# Patient Record
Sex: FEMALE | Race: WHITE | Employment: FULL TIME | URBAN - METROPOLITAN AREA
[De-identification: names, ages, dates, MRNs, and addresses within clinical notes are randomized per-mention and may not be internally consistent; named-entity substitution may affect disease eponyms.]

---

## 2021-04-15 PROBLEM — N39.41 URGE INCONTINENCE: Status: ACTIVE | Noted: 2017-08-08

## 2021-04-15 PROBLEM — Z86.008 HISTORY OF CARCINOMA IN SITU OF BLADDER: Status: ACTIVE | Noted: 2021-03-10

## 2021-11-08 ENCOUNTER — TELEPHONE (OUTPATIENT)
Dept: UROLOGY | Age: 61
End: 2021-11-08

## 2021-11-08 NOTE — TELEPHONE ENCOUNTER
I spoke with Ac Mcnamara the patient will report to the infusion center at 0730 M-F and report to the ER at [address]. Saturday and Sunday patient will need to report to the ER at 0730 and 1930. Regristration updated. Rx faxed to pharmacy. Patient informed of all and voiced understanding. She will be here tomorrow morning at 0730 for her first infusion.

## 2021-11-08 NOTE — TELEPHONE ENCOUNTER
Urine culture is positive for infection. Stop Bactrim. She will need IV antibiotics twice per day for 7 days. Order placed in the chart. Please set up through outpatient infusion.

## 2021-12-10 PROBLEM — C67.4 MALIGNANT NEOPLASM OF POSTERIOR WALL OF URINARY BLADDER (HCC): Status: ACTIVE | Noted: 2021-12-10

## 2021-12-10 PROBLEM — R30.0 DYSURIA: Status: ACTIVE | Noted: 2021-12-10

## 2021-12-13 ENCOUNTER — TELEPHONE (OUTPATIENT)
Dept: UROLOGY | Age: 61
End: 2021-12-13

## 2021-12-13 RX ORDER — SULFAMETHOXAZOLE AND TRIMETHOPRIM 800; 160 MG/1; MG/1
1 TABLET ORAL 2 TIMES DAILY
Qty: 20 TABLET | Refills: 0 | Status: SHIPPED | OUTPATIENT
Start: 2021-12-13 | End: 2021-12-23

## 2021-12-13 NOTE — TELEPHONE ENCOUNTER
Urine culture is positive for infection. We sent in culture specific Bactrim. Take this antibiotic until gone. Take this with food and eat yogurt once per day to prevent GI upset. If you develop nausea, vomiting, or fevers call the office or go to the ER. If your urinary symptoms do not improve once completing the antibiotics call our office.        [did not order repeat culture prior to cysto due to frequent UTIs]

## 2022-02-25 PROBLEM — R39.15 URGENCY OF URINATION: Status: ACTIVE | Noted: 2022-02-25

## 2022-02-25 PROBLEM — N30.90 CYSTITIS: Status: ACTIVE | Noted: 2022-02-25

## 2022-02-25 PROBLEM — D09.0 CARCINOMA IN SITU OF BLADDER: Status: ACTIVE | Noted: 2022-02-25

## 2022-07-22 PROBLEM — C67.9 TRANSITIONAL CELL CARCINOMA OF BLADDER (HCC): Status: ACTIVE | Noted: 2022-07-22
